# Patient Record
Sex: MALE | Race: WHITE | NOT HISPANIC OR LATINO | Employment: OTHER | URBAN - METROPOLITAN AREA
[De-identification: names, ages, dates, MRNs, and addresses within clinical notes are randomized per-mention and may not be internally consistent; named-entity substitution may affect disease eponyms.]

---

## 2017-02-27 ENCOUNTER — TRANSCRIBE ORDERS (OUTPATIENT)
Dept: ADMINISTRATIVE | Facility: HOSPITAL | Age: 77
End: 2017-02-27

## 2017-02-27 DIAGNOSIS — I20.9 OTHER AND UNSPECIFIED ANGINA PECTORIS: Primary | ICD-10-CM

## 2017-03-08 ENCOUNTER — HOSPITAL ENCOUNTER (OUTPATIENT)
Dept: NON INVASIVE DIAGNOSTICS | Facility: CLINIC | Age: 77
Discharge: HOME | End: 2017-03-08
Payer: COMMERCIAL

## 2017-03-08 DIAGNOSIS — I20.9 OTHER AND UNSPECIFIED ANGINA PECTORIS: ICD-10-CM

## 2017-03-08 LAB
CHEST PAIN STATEMENT: NORMAL
MAX DIASTOLIC BP: 78 MMHG
MAX HEART RATE: 131 BPM
MAX PREDICTED HEART RATE: 142 BPM
MAX. SYSTOLIC BP: 160 MMHG
PROTOCOL NAME: NORMAL
TARGET HR FORMULA: NORMAL
TEST INDICATION: NORMAL
TIME IN EXERCISE PHASE: 360 S

## 2017-03-08 PROCEDURE — 93017 CV STRESS TEST TRACING ONLY: CPT

## 2017-03-08 PROCEDURE — A9502 TC99M TETROFOSMIN: HCPCS

## 2017-03-08 PROCEDURE — 78452 HT MUSCLE IMAGE SPECT MULT: CPT

## 2025-05-08 ENCOUNTER — TELEPHONE (OUTPATIENT)
Dept: FAMILY MEDICINE CLINIC | Facility: CLINIC | Age: 85
End: 2025-05-08

## 2025-05-08 NOTE — TELEPHONE ENCOUNTER
NO LONGER A [facility] [place] PATIENT. PLEASE REMOVE FROM PATIENT PANEL.   NEW PCP:  2022, AS PER OBITUARY

## 2025-05-25 NOTE — TELEPHONE ENCOUNTER
25 11:17 PM      Per PCP Guidelines, the office is to complete the appropriate portion of the  Workflow and remove PCP at the office level.      Thank you  Jessica Grey